# Patient Record
Sex: FEMALE | Race: OTHER | HISPANIC OR LATINO | Employment: UNEMPLOYED | URBAN - METROPOLITAN AREA
[De-identification: names, ages, dates, MRNs, and addresses within clinical notes are randomized per-mention and may not be internally consistent; named-entity substitution may affect disease eponyms.]

---

## 2023-01-30 ENCOUNTER — HOSPITAL ENCOUNTER (EMERGENCY)
Facility: HOSPITAL | Age: 18
Discharge: HOME | End: 2023-01-30
Attending: EMERGENCY MEDICINE

## 2023-01-30 VITALS
HEIGHT: 63 IN | RESPIRATION RATE: 18 BRPM | DIASTOLIC BLOOD PRESSURE: 79 MMHG | WEIGHT: 104.06 LBS | OXYGEN SATURATION: 100 % | SYSTOLIC BLOOD PRESSURE: 130 MMHG | TEMPERATURE: 97.8 F | BODY MASS INDEX: 18.44 KG/M2 | HEART RATE: 95 BPM

## 2023-01-30 DIAGNOSIS — R30.0 DYSURIA: Primary | ICD-10-CM

## 2023-01-30 DIAGNOSIS — Z20.2 EXPOSURE TO STD: ICD-10-CM

## 2023-01-30 LAB
BACTERIA UR QL AUTO: NORMAL /HPF
BILIRUB UR QL STRIP: NEGATIVE
CLARITY UR: CLEAR
COLOR UR: YELLOW
EXT PREGNANCY TEST URINE: NEGATIVE
EXT. CONTROL: NORMAL
GLUCOSE UR STRIP-MCNC: NEGATIVE MG/DL
HGB UR QL STRIP.AUTO: NEGATIVE
KETONES UR STRIP-MCNC: NEGATIVE MG/DL
LEUKOCYTE ESTERASE UR QL STRIP: ABNORMAL
NITRITE UR QL STRIP: NEGATIVE
NON-SQ EPI CELLS URNS QL MICRO: NORMAL /HPF
PH UR STRIP.AUTO: 7 [PH] (ref 4.5–8)
PROT UR STRIP-MCNC: NEGATIVE MG/DL
RBC #/AREA URNS AUTO: NORMAL /HPF
SP GR UR STRIP.AUTO: >=1.03 (ref 1–1.03)
UROBILINOGEN UR QL STRIP.AUTO: 0.2 E.U./DL
WBC #/AREA URNS AUTO: NORMAL /HPF

## 2023-01-30 RX ORDER — DOXYCYCLINE HYCLATE 100 MG/1
100 CAPSULE ORAL ONCE
Status: COMPLETED | OUTPATIENT
Start: 2023-01-30 | End: 2023-01-30

## 2023-01-30 RX ORDER — DOXYCYCLINE HYCLATE 100 MG/1
100 CAPSULE ORAL 2 TIMES DAILY
Qty: 14 CAPSULE | Refills: 0 | Status: SHIPPED | OUTPATIENT
Start: 2023-01-30 | End: 2023-02-06

## 2023-01-30 RX ADMIN — DOXYCYCLINE 100 MG: 100 CAPSULE ORAL at 18:43

## 2023-01-30 RX ADMIN — LIDOCAINE HYDROCHLORIDE 500 MG: 10 INJECTION, SOLUTION EPIDURAL; INFILTRATION; INTRACAUDAL; PERINEURAL at 18:44

## 2023-01-31 NOTE — ED PROVIDER NOTES
History  Chief Complaint   Patient presents with   • Possible UTI     Burning with urination and urinary frequency      Patient is a 24-year-old female with no significant past medical history presents for evaluation of possible UTI  She states that she has had burning with urination and urinary frequency intermittently over the last month or so  She states occasional suprapubic discomfort with urination as well  She states that she believes she had an STD exposure as she found some medical discharge paperwork from a month ago for her ex-boyfriend stating that he had chlamydia  Patient states that he never told her this  She is concerned that she could have it as well  She states the last time she had STD testing was 1 year ago when she was pregnant  She denies fever, chills, chest pain, shortness of breath, no abdominal pain, vaginal discharge or abnormal bleeding, genital rash or lesions  None       History reviewed  No pertinent past medical history  History reviewed  No pertinent surgical history  History reviewed  No pertinent family history  I have reviewed and agree with the history as documented  E-Cigarette/Vaping     E-Cigarette/Vaping Substances     Social History     Tobacco Use   • Smoking status: Never   • Smokeless tobacco: Never   Substance Use Topics   • Alcohol use: Yes   • Drug use: Yes     Types: Marijuana       Review of Systems   Constitutional: Negative for chills and fever  Respiratory: Negative for shortness of breath  Cardiovascular: Negative for chest pain  Gastrointestinal: Positive for abdominal pain (occasional suprapubic pain)  Negative for diarrhea, nausea and vomiting  Genitourinary: Positive for dysuria and frequency  Negative for difficulty urinating, flank pain, hematuria, vaginal bleeding, vaginal discharge and vaginal pain  All other systems reviewed and are negative  Physical Exam  Physical Exam  Vitals and nursing note reviewed  Constitutional:       General: She is not in acute distress  Appearance: Normal appearance  She is normal weight  She is not ill-appearing, toxic-appearing or diaphoretic  HENT:      Head: Normocephalic and atraumatic  Right Ear: External ear normal       Left Ear: External ear normal       Mouth/Throat:      Mouth: Mucous membranes are moist    Eyes:      Conjunctiva/sclera: Conjunctivae normal    Cardiovascular:      Rate and Rhythm: Normal rate and regular rhythm  Heart sounds: Normal heart sounds  No murmur heard  Pulmonary:      Effort: Pulmonary effort is normal  No respiratory distress  Breath sounds: Normal breath sounds  No stridor  No wheezing, rhonchi or rales  Chest:      Chest wall: No tenderness  Abdominal:      General: Abdomen is flat  Bowel sounds are normal  There is no distension  Palpations: Abdomen is soft  Tenderness: There is no abdominal tenderness  There is no guarding  Genitourinary:     Comments: Deferred   Musculoskeletal:         General: Normal range of motion  Skin:     General: Skin is warm and dry  Neurological:      Mental Status: She is alert     Psychiatric:         Mood and Affect: Mood normal          Vital Signs  ED Triage Vitals [01/30/23 1657]   Temperature Pulse Respirations Blood Pressure SpO2   97 8 °F (36 6 °C) 95 18 130/79 100 %      Temp Source Heart Rate Source Patient Position - Orthostatic VS BP Location FiO2 (%)   Oral Monitor Sitting Right arm --      Pain Score       --           Vitals:    01/30/23 1657   BP: 130/79   Pulse: 95   Patient Position - Orthostatic VS: Sitting         Visual Acuity      ED Medications  Medications   cefTRIAXone (ROCEPHIN) 500 mg in lidocaine (PF) (XYLOCAINE-MPF) 1 % IM only syringe (500 mg Intramuscular Given 1/30/23 1844)   doxycycline hyclate (VIBRAMYCIN) capsule 100 mg (100 mg Oral Given 1/30/23 1843)       Diagnostic Studies  Results Reviewed     Procedure Component Value Units Date/Time Urine Microscopic [[id number]]  (Normal) Collected: 01/30/23 1755    Lab Status: Final result Specimen: Urine, Clean Catch Updated: 01/30/23 1830     RBC, UA None Seen /hpf      WBC, UA 2-4 /hpf      Epithelial Cells Occasional /hpf      Bacteria, UA Occasional /hpf     POCT pregnancy, urine [[id number]]  (Normal) Resulted: 01/30/23 1803    Lab Status: Final result Updated: 01/30/23 1804     EXT Preg Test, Ur Negative     Control Valid    Urine Macroscopic, POC [[id number]]  (Abnormal) Collected: 01/30/23 1755    Lab Status: Final result Specimen: Urine Updated: 01/30/23 1756     Color, UA Yellow     Clarity, UA Clear     pH, UA 7 0     Leukocytes, UA Trace     Nitrite, UA Negative     Protein, UA Negative mg/dl      Glucose, UA Negative mg/dl      Ketones, UA Negative mg/dl      Urobilinogen, UA 0 2 E U /dl      Bilirubin, UA Negative     Occult Blood, UA Negative     Specific Gravity, UA >=1 030    Narrative:      CLINITEK RESULT                 No orders to display              Procedures  Procedures         ED Course                                             Medical Decision Making  Patient with possible STD exposure and has been having intermittent episodes of dysuria, frequency and suprapubic discomfort over the last month  Will check urine pregnancy, UA and send GC/chlamydia for testing  Urine pregnancy negative  UA without sign of UTI at this time  Explained to patient that GC/chlamydia testing can take 3 to 4 days to result and she will be contacted with positive results  Given her history of symptoms and suspected exposure to chlamydia will treat empirically at this time with IM Rocephin and p o  doxycycline  Sent a prescription for 1 week of doxycycline to the pharmacy  Also discussed following up with the [facility] for further STD testing/HIV testing as needed    Patient states she is new to the area and needs a PCP, OB/GYN, gastroenterologist   Will provide contact information on her discharge paperwork for her to call to set up outpatient appointments as needed  Discussed strict return precautions if symptoms worsen or new symptoms arise  Patient states understanding and agrees with plan  Dysuria: complicated acute illness or injury  Exposure to STD: complicated acute illness or injury  Amount and/or Complexity of Data Reviewed  Independent Historian:      Details: patient is historian   Labs: ordered  Risk  Prescription drug management  Disposition  Final diagnoses:   Dysuria   Exposure to STD     Time reflects when diagnosis was documented in both MDM as applicable and the Disposition within this note     Time User Action Codes Description Comment    1/30/2023  6:37 PM Bogdan Pimentel Add [R30 0] Dysuria     1/30/2023  6:37 PM Bogdan Pimentel Add [Z20 2] Exposure to STD       ED Disposition     ED Disposition   Discharge    Condition   Stable    Date/Time   Mon Jan 30, 2023  6:37 PM    Comment   Vamshi Mcelroy discharge to home/self care                 Follow-up Information     Follow up With Specialties Details Why Contact Info Additional [address] [facility] Family Medicine Schedule an appointment as soon as possible for a visit in 1 day  [address], 1324 [facility] [id number]  [address], [address], [address], 250 [address] [facility] Obstetrics and Gynecology Schedule an appointment as soon as possible for a visit in 1 day  1900 [facility] 1400 [facility] [address]  [address], [address], [address], [address] [facility] Gastroenterology [facility] Gastroenterology Schedule an appointment as soon as possible for a visit  As needed [address] [facility] [address]  [place] [address] [place] [address] [place] [address]  Lindsay Orozco [address] [facility], [address] [facility] [address], [address], [place], [place], [address]   [facility] Emergency Department Emergency Medicine  If symptoms worsen [facility] [phone]  112 [facility] Emergency Department, [address]  , [facility], [place], [address]          Discharge Medication List as of 1/30/2023  6:38 PM      START taking these medications    Details   doxycycline hyclate (VIBRAMYCIN) 100 mg capsule Take 1 capsule (100 mg total) by mouth 2 (two) times a day for 7 days, Starting Mon 1/30/2023, Until Mon 2/6/2023, Print             No discharge procedures on file      PDMP Review     None          ED Provider  Electronically Signed by           Prema Christiansen PA-C  01/30/23 2022

## 2023-03-11 ENCOUNTER — HOSPITAL ENCOUNTER (EMERGENCY)
Facility: HOSPITAL | Age: 18
Discharge: HOME | End: 2023-03-11
Attending: EMERGENCY MEDICINE

## 2023-03-11 VITALS
RESPIRATION RATE: 16 BRPM | WEIGHT: 106.7 LBS | SYSTOLIC BLOOD PRESSURE: 125 MMHG | DIASTOLIC BLOOD PRESSURE: 76 MMHG | HEART RATE: 68 BPM | TEMPERATURE: 97.7 F | BODY MASS INDEX: 18.9 KG/M2 | OXYGEN SATURATION: 100 %

## 2023-03-11 DIAGNOSIS — R42 DIZZINESS: ICD-10-CM

## 2023-03-11 DIAGNOSIS — R11.0 NAUSEA: Primary | ICD-10-CM

## 2023-03-11 DIAGNOSIS — Z71.1 CONCERN ABOUT UNPLANNED PREGNANCY WITHOUT DIAGNOSIS: ICD-10-CM

## 2023-03-11 LAB
BACTERIA UR QL AUTO: ABNORMAL /HPF
BILIRUB UR QL STRIP: NEGATIVE
CLARITY UR: CLEAR
COLOR UR: YELLOW
EXT PREGNANCY TEST URINE: NEGATIVE
EXT. CONTROL: NORMAL
GLUCOSE UR STRIP-MCNC: NEGATIVE MG/DL
HGB UR QL STRIP.AUTO: NEGATIVE
KETONES UR STRIP-MCNC: NEGATIVE MG/DL
LEUKOCYTE ESTERASE UR QL STRIP: ABNORMAL
MUCOUS THREADS UR QL AUTO: ABNORMAL
NITRITE UR QL STRIP: NEGATIVE
NON-SQ EPI CELLS URNS QL MICRO: ABNORMAL /HPF
PH UR STRIP.AUTO: 6 [PH] (ref 4.5–8)
PROT UR STRIP-MCNC: NEGATIVE MG/DL
RBC #/AREA URNS AUTO: ABNORMAL /HPF
SP GR UR STRIP.AUTO: 1.02 (ref 1–1.03)
UROBILINOGEN UR QL STRIP.AUTO: 0.2 E.U./DL
WBC #/AREA URNS AUTO: ABNORMAL /HPF

## 2023-03-11 RX ORDER — METOCLOPRAMIDE 10 MG/1
10 TABLET ORAL EVERY 6 HOURS
Qty: 30 TABLET | Refills: 0 | Status: SHIPPED | OUTPATIENT
Start: 2023-03-11

## 2023-03-11 RX ORDER — FAMOTIDINE 20 MG
1 TABLET ORAL DAILY
Qty: 30 TABLET | Refills: 0 | Status: SHIPPED | OUTPATIENT
Start: 2023-03-11

## 2023-03-11 RX ORDER — METOCLOPRAMIDE 10 MG/1
10 TABLET ORAL ONCE
Status: COMPLETED | OUTPATIENT
Start: 2023-03-11 | End: 2023-03-11

## 2023-03-11 RX ADMIN — METOCLOPRAMIDE 10 MG: 10 TABLET ORAL at 15:39

## 2023-03-11 NOTE — ED PROVIDER NOTES
History  Chief Complaint   Patient presents with   • Dizziness     C/o dizziness as well as nausea and headache  Ongoing for a week and a half     22 YO female presents with nausea and dizziness  States this has been present for the last 1 5 weeks, constant  She has had decreased PO intake  She has had some abdominal discomfort  Patient states she is late for her period, she does not use contraception but states she is interested in taking oral contraception  Pt denies CP/SOB/F/C/D/C, no dysuria, burning on urination or blood in urine  History provided by:  Patient   used: No        None       History reviewed  No pertinent past medical history  History reviewed  No pertinent surgical history  History reviewed  No pertinent family history  I have reviewed and agree with the history as documented  E-Cigarette/Vaping     E-Cigarette/Vaping Substances     Social History     Tobacco Use   • Smoking status: Never   • Smokeless tobacco: Never   Substance Use Topics   • Alcohol use: Yes   • Drug use: Yes     Types: Marijuana       Review of Systems   Constitutional: Negative for chills, fatigue and fever  HENT: Negative for dental problem  Eyes: Negative for visual disturbance  Respiratory: Negative for shortness of breath  Cardiovascular: Negative for chest pain  Gastrointestinal: Positive for nausea  Negative for abdominal pain, diarrhea and vomiting  Genitourinary: Negative for dysuria and frequency  Musculoskeletal: Negative for arthralgias  Skin: Negative for rash  Neurological: Positive for dizziness  Negative for weakness and light-headedness  Psychiatric/Behavioral: Negative for agitation, behavioral problems and confusion  All other systems reviewed and are negative  Physical Exam  Physical Exam  Vitals and nursing note reviewed  Constitutional:       Appearance: Normal appearance  HENT:      Head: Normocephalic and atraumatic     Eyes: Extraocular Movements: Extraocular movements intact  Conjunctiva/sclera: Conjunctivae normal    Cardiovascular:      Rate and Rhythm: Normal rate and regular rhythm  Pulses: Normal pulses  Heart sounds: Normal heart sounds  Pulmonary:      Effort: Pulmonary effort is normal       Breath sounds: Normal breath sounds  Abdominal:      General: There is no distension  Musculoskeletal:         General: Normal range of motion  Cervical back: Normal range of motion  Skin:     Findings: No rash  Neurological:      General: No focal deficit present  Mental Status: She is alert  Cranial Nerves: No cranial nerve deficit     Psychiatric:         Mood and Affect: Mood normal          Vital Signs  ED Triage Vitals [03/11/23 1418]   Temperature Pulse Respirations Blood Pressure SpO2   97 7 °F (36 5 °C) 83 16 141/69 100 %      Temp src Heart Rate Source Patient Position - Orthostatic VS BP Location FiO2 (%)   -- Monitor Sitting Right arm --      Pain Score       --           Vitals:    03/11/23 1418 03/11/23 1542   BP: 141/69 125/76   Pulse: 83 68   Patient Position - Orthostatic VS: Sitting Sitting         Visual Acuity      ED Medications  Medications   metoclopramide (REGLAN) tablet 10 mg (10 mg Oral Given 3/11/23 1539)       Diagnostic Studies  Results Reviewed     Procedure Component Value Units Date/Time    Urine Microscopic [[id number]]  (Abnormal) Collected: 03/11/23 1515    Lab Status: Final result Specimen: Urine, Clean Catch Updated: 03/11/23 1545     RBC, UA 0-1 /hpf      WBC, UA 0-1 /hpf      Epithelial Cells Occasional /hpf      Bacteria, UA Occasional /hpf      MUCUS THREADS Occasional    Urine Macroscopic, POC [[id number]]  (Abnormal) Collected: 03/11/23 1515    Lab Status: Final result Specimen: Urine Updated: 03/11/23 1516     Color, UA Yellow     Clarity, UA Clear     pH, UA 6 0     Leukocytes, UA Trace     Nitrite, UA Negative     Protein, UA Negative mg/dl      Glucose, UA Negative mg/dl      Ketones, UA Negative mg/dl      Urobilinogen, UA 0 2 E U /dl      Bilirubin, UA Negative     Occult Blood, UA Negative     Specific Gravity, UA 1 025    Narrative:      CLINITEK RESULT    POCT pregnancy, urine [[id number]]  (Normal) Resulted: 03/11/23 1515    Lab Status: Final result Updated: 03/11/23 1515     EXT Preg Test, Ur Negative     Control Valid                 No orders to display              Procedures  Procedures         ED Course         CRAFFT    Flowsheet Row Most Recent Value   SBIRT (13-21 yo)    In order to provide better care to our patients, we are screening all of our patients for alcohol and drug use  Would it be okay to ask you these screening questions? No Filed at: 03/11/2023 1541                                          Medical Decision Making  1  Nausea - Patient has negative urine pregnancy test, she states symptoms have been present for the last 1 5 weeks  Recommend symptomatic care with antiemetics  Recommend recheck of pregnancy status at home if no period  Will prescribe prenatal vitamins and Reglan  Concern about unplanned pregnancy without diagnosis: acute illness or injury  Dizziness: acute illness or injury  Nausea: acute illness or injury  Amount and/or Complexity of Data Reviewed  Labs: ordered  Risk  OTC drugs  Prescription drug management            Disposition  Final diagnoses:   Nausea   Dizziness   Concern about unplanned pregnancy without diagnosis     Time reflects when diagnosis was documented in both MDM as applicable and the Disposition within this note     Time User Action Codes Description Comment    3/11/2023  3:44 PM [facility] Add [R11 0] Nausea     3/11/2023  3:44 PM [facility] E Add [R42] Dizziness     3/11/2023  3:44 PM [facility] E Add [Z71 1] Concern about unplanned pregnancy without diagnosis       ED Disposition     ED Disposition   Discharge    Condition   Stable    Date/Time   Sat Mar 11, 2023  3:44 PM    Comment [address] discharge to home/self care  Follow-up Information     Follow up With Specialties Details Why Contact Info Additional [address] 2nd Floor Family Medicine   [address] [place] [address] [place] Obstetrics and Gynecology   [address] [facility] [address]  [place] [address] [facility] [address] [address], [address], [address] [place] [address], [address] [phone]          Discharge Medication List as of 3/11/2023  3:47 PM      START taking these medications    Details   metoclopramide (REGLAN) 10 mg tablet Take 1 tablet (10 mg total) by mouth every 6 (six) hours, Starting Sat 3/11/2023, Normal      Prenatal Vit-Fe Fumarate-FA (Prenatal Complete) 14-0 4 MG TABS Take 1 tablet by mouth daily, Starting Sat 3/11/2023, Normal             No discharge procedures on file      PDMP Review     None          ED Provider  Electronically Signed by           Frederick Angel MD  03/11/23 0064

## 2023-03-11 NOTE — DISCHARGE INSTRUCTIONS
Take the reglan as needed for nausea  If you continue to have concern regarding possible pregnancy, take a home pregnancy test in one week  Treat yourself as if you are pregnant, abstain from alcohol, nicotine  Take the prescribed prenatal vitamins  The number for the [facility] has been included in these discharge instructions, you should call to establish continuing medical care  The number for the [facility] is included in these discharge instructions, call to be seen in the office for further evaluation